# Patient Record
(demographics unavailable — no encounter records)

---

## 2024-11-14 NOTE — DATA REVIEWED
[FreeTextEntry1] : 3 v r ankle  distal fibula fx no significant talar shift or increased mcs  2 v tib/fib distal fibula fx

## 2024-11-14 NOTE — HISTORY OF PRESENT ILLNESS
[7] : 7 [2] : 2 [de-identified] : Date of Injury/Onset:11/12/2024 Pain: At Rest:7/10 With Activity: Mechanism of injury: This is NOT a Work Related Injury being treated under Worker's Compensation. This is NOT an athletic injury occurring associated with an interscholastic or organized sports team. Quality of symptoms: Improves with: Worse with: Prior treatment: Prior Imaging: Reports Available For Review Today: Out of work/sport: School/Sport/Position/Occupation  11/14/2024 CARLYLE huddleston is here today for evaluation of RT fibula. patient injured RT leg on the stairs. patient was seen at urgent care x-ray shows fracture. patient describes that pain as throbbing no numbness or tingling. Patient has been taking pain medication for relief.

## 2024-11-14 NOTE — DISCUSSION/SUMMARY
[de-identified] : R distal fibula fx pt has DM pt has been walking on the fracture without issue likely stable fracture and pt is not interested in surgery d/w pt and son recommend Tall [place] on at all times like a cast RTC 2 weeks  next visit: WB 3 v Ankle XR

## 2024-11-14 NOTE — IMAGING
[de-identified] : Patient ambulates with a Antalgic gait   Right Foot and Ankle: Inspection: Erythema: None Swelling: Diffuse swelling laterally, anteriorly, medially Ecchymosis: None Abrasions: None Rashes: None Surgical Scars: None Effusion: None Atrophy: None Deformity: None Pes Arrey Valgus: Negative Pes Cavus: Negative Hallux Valgus: Negative Clawtoe/Hammertoe: Negative  Palpation: Crepitus: None Proximal Fibula: Nontender Distal Fibula: Nontender Medial Malleolus: tender Lateral Malleolus: tender AITFL: tender PITFL: Nontender ATFL: tender CFL: tender Deltoid: tender Calcaneus: Nontender Talar Head/Neck: Nontender Lateral Process of Talus: Nontender Anterior Facet of Calcaneus: Nontender Peroneal Tendons: tender Posterior Tibialis: Nontender Achilles Tendon: Nontender & Intact Achilles Insertion: Nontender Retrocalcaneal Bursa: Nontender Anterior Capsule: tender Subtalar Joint: Nontender Talonavicular Joint: Nontender Calcaneocuboid Joint: Nontender Heel pad: Nontender Medial Tubercle of Calcaneus: Nontender Plantar Fascia: Nontender Midfoot: Nontender Forefoot: Nontender Sesamoids: Nontender  ROM: Ankle Dorsiflexion: 0 degrees Ankle Plantar Flexion: 35 degrees Eversion: 15 degrees Inversion: 25 degrees, painful  Motor: Dorsiflexion: 5 out of 5 Plantar Flexion: 5 out of 5 Inversion: 5  out of 5 Eversion: 5 out of 5, painful  Provocative Testing: Anterior Drawer: Positive Syndesmosis Squeeze Test: Negative Circumduction test: Negative Resisted ER: Painful  Axial Grind 1st MTP: Painless Neurologic Exam: L4-S1 Sensation: Grossly Intact Tinels: Negative  Vascular Exam/Pulses: Dorsalis Pedis: 2+ Posterior Tibialis: 2+ Capillary Refill: <2 Seconds Other Exams: None Pertinent Contralateral Findings: None      Cardiomyopathy

## 2024-12-02 NOTE — DISCUSSION/SUMMARY
[de-identified] : R distal fibula fx pt has DM pt has been walking on the fracture without issue likely stable fracture and pt is not interested in surgery d/w pt and son recommend Tall [place] on at all times like a cast RTC 2 weeks  next visit: WB 3 v Ankle XR *** 12/2 R distal fibula fx pt has DM There is 2/2 displacement, however, pt states pain is improving and not interested in surgical intervention d/w pt and son recommend Tall [place] on at all times like a cast RTC 2 weeks  next visit: WB 3 v Ankle XR

## 2024-12-02 NOTE — HISTORY OF PRESENT ILLNESS
[7] : 7 [2] : 2 [de-identified] : Date of Injury/Onset:11/12/2024 Pain: At Rest:7/10 With Activity: Mechanism of injury: This is NOT a Work Related Injury being treated under Worker's Compensation. This is NOT an athletic injury occurring associated with an interscholastic or organized sports team. Quality of symptoms: Improves with: Worse with: Prior treatment: Prior Imaging: Reports Available For Review Today: Out of work/sport: School/Sport/Position/Occupation  11/14/2024 CARLYLE he is here today for evaluation of RT fibula. patient injured RT leg on the stairs. patient was seen at urgent care x-ray shows fracture. patient describes that pain as throbbing no numbness or tingling. Patient has been taking pain medication for relief.  12/02/2024: CARLYLE he is here today for evaluation of RT fibula. He notes improvement form last visit pt states his pain is currently 3/10. He states he is wearing the boot intermittently for few hours per day.

## 2024-12-02 NOTE — DATA REVIEWED
[FreeTextEntry1] : 3 v r ankle  distal fibula fx no significant talar shift or increased mcs  2 v tib/fib distal fibula fx  3 v r ankle  distal fibula fx with 2/2 displacement no significant talar shift or increased mcs

## 2024-12-02 NOTE — IMAGING
[de-identified] : Patient ambulates with a Antalgic gait   Right Foot and Ankle: Inspection: Erythema: None Swelling: mild swelling laterally, anteriorly, medially Ecchymosis: None Abrasions: None Rashes: None Surgical Scars: None Effusion: None Atrophy: None Deformity: None Pes [place] Valgus: Negative Pes Cavus: Negative Hallux Valgus: Negative Clawtoe/Hammertoe: Negative  Palpation: Crepitus: None Proximal Fibula: Nontender Distal Fibula: Nontender Medial Malleolus: tender Lateral Malleolus: tender AITFL: tender PITFL: Nontender ATFL: tender CFL: tender Deltoid: tender Calcaneus: Nontender Talar Head/Neck: Nontender Lateral Process of Talus: Nontender Anterior Facet of Calcaneus: Nontender Peroneal Tendons: nontender Posterior Tibialis: Nontender Achilles Tendon: Nontender & Intact Achilles Insertion: Nontender Retrocalcaneal Bursa: Nontender Anterior Capsule: nontender Subtalar Joint: Nontender Talonavicular Joint: Nontender Calcaneocuboid Joint: Nontender Heel pad: Nontender Medial Tubercle of Calcaneus: Nontender Plantar Fascia: Nontender Midfoot: Nontender Forefoot: Nontender Sesamoids: Nontender  ROM: Ankle Dorsiflexion: 0 degrees Ankle Plantar Flexion: 35 degrees Eversion: 15 degrees Inversion: 25 degrees, painful  Motor: Dorsiflexion: 5 out of 5 Plantar Flexion: 5 out of 5 Inversion: 5  out of 5 Eversion: 5 out of 5, painful  Provocative Testing: Anterior Drawer: Positive Syndesmosis Squeeze Test: Negative Circumduction test: Negative Resisted ER: Painful  Axial Grind 1st MTP: Painless Neurologic Exam: L4-S1 Sensation: Grossly Intact Tinels: Negative  Vascular Exam/Pulses: Dorsalis Pedis: 2+ Posterior Tibialis: 2+ Capillary Refill: <2 Seconds Other Exams: None Pertinent Contralateral Findings: None